# Patient Record
Sex: FEMALE | Race: BLACK OR AFRICAN AMERICAN | NOT HISPANIC OR LATINO | Employment: OTHER | URBAN - METROPOLITAN AREA
[De-identification: names, ages, dates, MRNs, and addresses within clinical notes are randomized per-mention and may not be internally consistent; named-entity substitution may affect disease eponyms.]

---

## 2017-04-20 LAB — CRC RECOMMENDATION EXT: NORMAL

## 2020-01-14 PROBLEM — I10 ESSENTIAL HYPERTENSION: Status: ACTIVE | Noted: 2020-01-14

## 2020-01-14 PROBLEM — G47.33 OSA (OBSTRUCTIVE SLEEP APNEA): Status: ACTIVE | Noted: 2020-01-14

## 2020-01-14 PROBLEM — K21.9 GASTROESOPHAGEAL REFLUX DISEASE: Status: ACTIVE | Noted: 2020-01-14

## 2020-01-14 PROBLEM — E66.3 OVERWEIGHT (BMI 25.0-29.9): Status: ACTIVE | Noted: 2020-01-14

## 2020-01-14 PROBLEM — R73.03 PRE-DIABETES: Status: ACTIVE | Noted: 2020-01-14

## 2020-01-14 PROBLEM — E78.5 HYPERLIPIDEMIA: Status: ACTIVE | Noted: 2020-01-14

## 2020-02-11 PROBLEM — R73.03 PRE-DIABETES: Status: RESOLVED | Noted: 2020-01-14 | Resolved: 2020-02-11

## 2020-02-11 PROBLEM — E11.9 TYPE 2 DIABETES MELLITUS WITHOUT COMPLICATION, WITHOUT LONG-TERM CURRENT USE OF INSULIN: Status: ACTIVE | Noted: 2020-02-11

## 2020-02-11 PROBLEM — F32.A DEPRESSION: Status: ACTIVE | Noted: 2020-02-11

## 2020-02-11 PROBLEM — E78.5 DYSLIPIDEMIA: Status: ACTIVE | Noted: 2020-02-11

## 2020-02-11 PROBLEM — F41.9 ANXIETY: Status: ACTIVE | Noted: 2020-02-11

## 2020-11-25 PROBLEM — Z79.4 TYPE 2 DIABETES MELLITUS WITHOUT COMPLICATION, WITH LONG-TERM CURRENT USE OF INSULIN: Status: ACTIVE | Noted: 2020-02-11

## 2020-11-25 PROBLEM — H25.813 COMBINED FORMS OF AGE-RELATED CATARACT OF BOTH EYES: Status: ACTIVE | Noted: 2020-11-25

## 2021-05-24 PROBLEM — M79.641 PAIN IN BOTH HANDS: Status: ACTIVE | Noted: 2021-05-24

## 2021-05-24 PROBLEM — M19.041 OSTEOARTHRITIS OF METACARPOPHALANGEAL (MCP) JOINT OF RIGHT THUMB: Status: ACTIVE | Noted: 2021-05-24

## 2021-05-24 PROBLEM — M79.642 PAIN IN BOTH HANDS: Status: ACTIVE | Noted: 2021-05-24

## 2021-06-02 PROBLEM — H25.811 COMBINED FORMS OF AGE-RELATED CATARACT OF RIGHT EYE: Status: ACTIVE | Noted: 2020-11-25

## 2022-07-05 PROBLEM — Z79.899 ENCOUNTER FOR LONG-TERM CURRENT USE OF MEDICATION: Status: ACTIVE | Noted: 2022-07-05

## 2022-07-05 PROBLEM — S03.00XA DISLOCATION OF JAW: Status: ACTIVE | Noted: 2022-07-05

## 2022-07-18 PROBLEM — M26.609 TMJ (TEMPOROMANDIBULAR JOINT SYNDROME): Status: ACTIVE | Noted: 2022-07-05

## 2022-08-18 PROBLEM — M26.659 ARTHROPATHY OF TEMPOROMANDIBULAR JOINT: Status: ACTIVE | Noted: 2022-07-05

## 2022-10-05 PROBLEM — G47.00 INSOMNIA: Chronic | Status: ACTIVE | Noted: 2022-10-05

## 2022-10-05 PROBLEM — L81.9 SKIN HYPOPIGMENTATION: Chronic | Status: ACTIVE | Noted: 2022-10-05

## 2022-10-05 PROBLEM — G62.9 NEUROPATHY: Status: ACTIVE | Noted: 2022-10-05

## 2022-10-05 PROBLEM — R51.9 NONINTRACTABLE HEADACHE: Status: ACTIVE | Noted: 2022-10-05

## 2022-10-05 PROBLEM — Z00.00 PREVENTATIVE HEALTH CARE: Status: ACTIVE | Noted: 2022-07-05

## 2022-11-06 ENCOUNTER — PATIENT OUTREACH (OUTPATIENT)
Dept: ADMINISTRATIVE | Facility: HOSPITAL | Age: 72
End: 2022-11-06

## 2023-01-05 PROBLEM — R41.3 IMPAIRED MEMORY: Status: ACTIVE | Noted: 2023-01-05

## 2023-01-05 PROBLEM — R06.02 SOBOE (SHORTNESS OF BREATH ON EXERTION): Status: ACTIVE | Noted: 2023-01-05

## 2023-01-05 PROBLEM — Z82.0 FAMILY HISTORY OF MEMORY LOSS: Status: ACTIVE | Noted: 2023-01-05

## 2023-04-04 ENCOUNTER — PATIENT OUTREACH (OUTPATIENT)
Dept: ADMINISTRATIVE | Facility: HOSPITAL | Age: 73
End: 2023-04-04

## 2023-05-31 PROBLEM — N32.81 OAB (OVERACTIVE BLADDER): Status: ACTIVE | Noted: 2023-05-31

## 2023-07-06 PROBLEM — R11.0 NAUSEA: Status: ACTIVE | Noted: 2023-07-06

## 2023-11-20 PROBLEM — Z00.00 PREVENTATIVE HEALTH CARE: Status: RESOLVED | Noted: 2022-07-05 | Resolved: 2023-11-20

## 2024-02-21 PROBLEM — Z76.0 ENCOUNTER FOR MEDICATION REFILL: Status: ACTIVE | Noted: 2024-02-21

## 2024-02-21 PROBLEM — R30.0 DYSURIA: Status: ACTIVE | Noted: 2024-02-21

## 2024-02-21 PROBLEM — R10.9 ABDOMINAL PAIN: Status: ACTIVE | Noted: 2024-02-21

## 2024-02-21 PROBLEM — M25.552 LEFT HIP PAIN: Status: ACTIVE | Noted: 2024-02-21

## 2024-02-21 PROBLEM — H93.8X3 EAR PRESSURE, BILATERAL: Status: ACTIVE | Noted: 2024-02-21

## 2024-02-21 PROBLEM — R44.1 VISUAL HALLUCINATIONS: Status: ACTIVE | Noted: 2024-02-21

## 2024-02-21 PROBLEM — H91.90 DECREASED HEARING: Status: ACTIVE | Noted: 2024-02-21

## 2024-02-21 PROBLEM — Z79.899 ON POTASSIUM WASTING DIURETIC THERAPY: Chronic | Status: ACTIVE | Noted: 2024-02-21

## 2024-03-07 PROBLEM — N32.81 OAB (OVERACTIVE BLADDER): Chronic | Status: ACTIVE | Noted: 2023-05-31

## 2024-03-07 PROBLEM — F32.A DEPRESSION: Chronic | Status: ACTIVE | Noted: 2020-02-11

## 2024-03-07 PROBLEM — F41.9 ANXIETY: Chronic | Status: ACTIVE | Noted: 2020-02-11

## 2024-03-07 PROBLEM — E66.3 OVERWEIGHT (BMI 25.0-29.9): Chronic | Status: ACTIVE | Noted: 2020-01-14

## 2024-03-07 PROBLEM — R41.3 IMPAIRED MEMORY: Chronic | Status: ACTIVE | Noted: 2023-01-05

## 2024-03-07 PROBLEM — R06.02 SOBOE (SHORTNESS OF BREATH ON EXERTION): Chronic | Status: ACTIVE | Noted: 2023-01-05

## 2024-03-07 PROBLEM — R30.0 DYSURIA: Chronic | Status: ACTIVE | Noted: 2024-02-21

## 2024-03-07 PROBLEM — G47.33 OSA (OBSTRUCTIVE SLEEP APNEA): Chronic | Status: ACTIVE | Noted: 2020-01-14

## 2024-03-07 PROBLEM — E78.5 DYSLIPIDEMIA: Chronic | Status: ACTIVE | Noted: 2020-02-11

## 2024-03-07 PROBLEM — I10 ESSENTIAL HYPERTENSION: Chronic | Status: ACTIVE | Noted: 2020-01-14

## 2024-03-07 PROBLEM — R11.0 NAUSEA: Status: RESOLVED | Noted: 2023-07-06 | Resolved: 2024-03-07

## 2024-03-07 PROBLEM — E78.5 HYPERLIPIDEMIA: Chronic | Status: ACTIVE | Noted: 2020-01-14

## 2024-03-07 PROBLEM — H91.90 DECREASED HEARING: Chronic | Status: ACTIVE | Noted: 2024-02-21

## 2024-03-07 PROBLEM — E11.65 TYPE 2 DIABETES MELLITUS WITH HYPERGLYCEMIA, WITHOUT LONG-TERM CURRENT USE OF INSULIN: Chronic | Status: ACTIVE | Noted: 2020-02-11

## 2024-03-07 PROBLEM — E11.65 TYPE 2 DIABETES MELLITUS WITH HYPERGLYCEMIA, WITHOUT LONG-TERM CURRENT USE OF INSULIN: Status: ACTIVE | Noted: 2020-02-11

## 2024-03-07 PROBLEM — R44.1 VISUAL HALLUCINATIONS: Chronic | Status: ACTIVE | Noted: 2024-02-21

## 2024-03-07 PROBLEM — M25.552 LEFT HIP PAIN: Chronic | Status: ACTIVE | Noted: 2024-02-21

## 2024-03-07 PROBLEM — S03.00XA DISLOCATION OF JAW: Status: RESOLVED | Noted: 2022-07-05 | Resolved: 2024-03-07

## 2024-03-07 PROBLEM — L81.9 SKIN HYPOPIGMENTATION: Chronic | Status: RESOLVED | Noted: 2022-10-05 | Resolved: 2024-03-07

## 2024-03-07 PROBLEM — K21.9 GASTROESOPHAGEAL REFLUX DISEASE: Chronic | Status: ACTIVE | Noted: 2020-01-14

## 2024-03-07 PROBLEM — H93.8X3 EAR PRESSURE, BILATERAL: Chronic | Status: ACTIVE | Noted: 2024-02-21

## 2024-04-05 PROBLEM — R26.81 UNSTEADY GAIT WHEN WALKING: Status: ACTIVE | Noted: 2024-04-05

## 2024-04-21 PROBLEM — R26.81 UNSTEADY GAIT WHEN WALKING: Chronic | Status: ACTIVE | Noted: 2024-04-05

## 2024-04-21 PROBLEM — G62.9 NEUROPATHY: Chronic | Status: ACTIVE | Noted: 2022-10-05

## 2024-07-15 PROBLEM — L30.9 DERMATITIS: Status: ACTIVE | Noted: 2024-07-15

## 2024-07-22 PROBLEM — M89.8X5 LYTIC BONE LESION OF HIP: Status: ACTIVE | Noted: 2024-07-22
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
## 2024-07-26 PROBLEM — I26.93 SINGLE SUBSEGMENTAL PULMONARY EMBOLISM WITHOUT ACUTE COR PULMONALE: Status: ACTIVE | Noted: 2024-07-26

## 2024-07-29 ENCOUNTER — PATIENT OUTREACH (OUTPATIENT)
Dept: ADMINISTRATIVE | Facility: CLINIC | Age: 74
End: 2024-07-29

## 2024-07-29 PROBLEM — L30.9 DERMATITIS: Chronic | Status: ACTIVE | Noted: 2024-07-15

## 2024-07-29 PROBLEM — E66.3 OVERWEIGHT (BMI 25.0-29.9): Chronic | Status: RESOLVED | Noted: 2020-01-14 | Resolved: 2024-07-29

## 2024-07-29 NOTE — PROGRESS NOTES
C3 nurse spoke with Judith Braden for a TCC post hospital discharge follow up call. The patient has a scheduled HOSFU with Carol Nino MD ([facility]) on 8/5/24 at 1600. No messages routed at this time.

## 2024-08-27 PROBLEM — I26.93 SINGLE SUBSEGMENTAL PULMONARY EMBOLISM WITHOUT ACUTE COR PULMONALE: Chronic | Status: ACTIVE | Noted: 2024-07-26

## 2024-09-26 ENCOUNTER — TELEPHONE (OUTPATIENT)
Dept: ADMINISTRATIVE | Facility: HOSPITAL | Age: 74
End: 2024-09-26

## 2024-09-26 VITALS — SYSTOLIC BLOOD PRESSURE: 114 MMHG | DIASTOLIC BLOOD PRESSURE: 55 MMHG
